# Patient Record
Sex: FEMALE | Race: WHITE | NOT HISPANIC OR LATINO | Employment: OTHER | URBAN - METROPOLITAN AREA
[De-identification: names, ages, dates, MRNs, and addresses within clinical notes are randomized per-mention and may not be internally consistent; named-entity substitution may affect disease eponyms.]

---

## 2021-01-08 ENCOUNTER — OFFICE VISIT CONVERTED (OUTPATIENT)
Dept: ORTHOPEDIC SURGERY | Facility: CLINIC | Age: 46
End: 2021-01-08
Attending: ORTHOPAEDIC SURGERY

## 2021-01-08 ENCOUNTER — CONVERSION ENCOUNTER (OUTPATIENT)
Dept: ORTHOPEDIC SURGERY | Facility: CLINIC | Age: 46
End: 2021-01-08

## 2021-02-22 ENCOUNTER — OFFICE VISIT CONVERTED (OUTPATIENT)
Dept: ORTHOPEDIC SURGERY | Facility: CLINIC | Age: 46
End: 2021-02-22
Attending: ORTHOPAEDIC SURGERY

## 2021-05-10 NOTE — H&P
History and Physical      Patient Name: Brandie Leach   Patient ID: [id number]   Sex: Female   YOB: 1975        Visit Date: January 8, 2021    Provider: Black Mckinney MD   Location: [facility]   Location Address: [address] [place] [address]  [id number]   Location Phone: [phone]          Chief Complaint  · Bilateral Knee Pain      History Of Present Illness  Brandie Leach is a 45 year old female who presents today to [facility].      Patient presents today for an evaluation of bilateral knee pain. Patient states her left knee is worse than right. She states she has had knee problems for over 20 years but has been managing it conservatively. Her pain has been on and off over the 20 years but lately the pain has been increasing and has become more consistent. She has tried physical therapy over the summer of 2020 that gave her some minor relief. She states her left knee pain hurts under her knee cap and her knee swells often. She states her left knee catches a lot as well. Her right knee is similar but isn't as bad and doesn't bother her as much as her left knee. 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       Past Medical History  Arthritis         Past Surgical History  Eye Implant         Allergy List  NO KNOWN DRUG ALLERGIES; NO KNOWN DRUG ALLERGIES       Allergies Reconciled  Family Medical History  Heart Disease         Social History  Alcohol Use (Current some day); lives with spouse; .; Recreational Drug Use (Never); Tobacco (Never); Working         Review of Systems  · Constitutional  o Denies  o : fever, chills, weight loss  · Cardiovascular  o Denies  o : chest pain, shortness of breath  · Gastrointestinal  o Denies  o : liver disease, heartburn, nausea, blood in stools  · Genitourinary  o Denies  o : painful urination, blood in urine  · Integument  o Denies  o : rash, itching  · Neurologic  o Denies  o : headache, weakness, loss of  "consciousness  · Musculoskeletal  o Denies  o : painful, swollen joints  · Psychiatric  o Denies  o : drug/alcohol addiction, anxiety, depression      Vitals  Date Time BP Position Site L\R Cuff Size HR RR TEMP (F) WT  HT  BMI kg/m2 BSA m2 O2 Sat FR L/min FiO2 HC       01/08/2021 03:19 PM      69 - R   160lbs 0oz 6'  1\" 21.11 1.93 100 %            Physical Examination  · Constitutional  o Appearance  o : well developed, well-nourished, no obvious deformities present  · Head and Face  o Head  o :   § Inspection  § : normocephalic  o Face  o :   § Inspection  § : no facial lesions  · Eyes  o Conjunctivae  o : conjunctivae normal  o Sclerae  o : sclerae white  · Ears, Nose, Mouth and Throat  o Ears  o :   § External Ears  § : appearance within normal limits  § Hearing  § : intact  o Nose  o :   § External Nose  § : appearance normal  · Neck  o Inspection/Palpation  o : normal appearance  o Range of Motion  o : full range of motion  · Respiratory  o Respiratory Effort  o : breathing unlabored  o Inspection of Chest  o : normal appearance  o Auscultation of Lungs  o : no audible wheezing or rales  · Cardiovascular  o Heart  o : regular rate  · Gastrointestinal  o Abdominal Examination  o : soft and non-tender  · Skin and Subcutaneous Tissue  o General Inspection  o : intact, no rashes  · Psychiatric  o General  o : Alert and oriented x3  o Judgement and Insight  o : judgment and insight intact  o Mood and Affect  o : mood normal, affect appropriate  · Right Knee  o Inspection  o : Sensation grossly intact. Neurovascular intact. Skin intact. Calf supple, non-tender. Limping gait. No swelling, skin discoloration or atrophy. Full flexion and extension. Mild crepitus. Stable to valgus/varus stress. Good strength in quadriceps, hamstrings, dorsiflexors, and plantar flexors. Mild valgus deformity.   · Left Knee  o Inspection  o : Sensation grossly intact. Neurovascular intact. Skin intact. Calf supple, non-tender. Positive " crepitus. Full flexion and extension. Mild swelling. No skin discoloration or atrophy. Full weight bearing. Limping gait.Stable to valgus/varus stress. Good strength in quadriceps, hamstrings, dorsiflexors, and plantar flexors. Valgus deformity. Palpable bone spurs.   · In Office Procedures  o View  o : LAT/SUNRISE/STANDING   o Site  o : bilateral, knee  o Indication  o : Bilateral knee pain  o Study  o : X-rays ordered, taken in the office, and reviewed today.  o Xray  o : Diffuse cartilage in the left knee. Advanced degenerative changes of patellofemoral. Right knee shows moderate amount of osteoarthritis.   · Imaging  o Imaging  o : 12/4/20 LEFT MRI: 1. 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 Lateral meniscus is also intact. 2. Cruciate and collateral ligaments are intact. 3. Advanced patellofemoral arthrosis, detailed above. 4. Moderate grade chondromalacia along the central and posterior weightbearing medial femoral condyle. Small marginal osteophytes at the medial and lateral joint lines. 5. Small joint effusion with multiple subcentimeter loose bodies along the far posterior nonweightbearing aspect of the lateral femoral condyle. 6. 6.3 cm popliteal cyst.           Assessment  · Primary osteoarthritis of right knee     715.16/M17.11  · Primary osteoarthritis of left knee     715.16/M17.12  · Pain in both knees, unspecified chronicity       Pain in right knee     719.46/M25.561  Pain in left knee     719.46/M25.562      Plan  · Orders  o Knee (Left) [facility] ([id number]) - 719.46/M25.562 - 01/08/2021  o Knee (Right) [facility] ([id number]) - 719.46/M25.561 - 01/08/2021  · Medications  o Medications have been Reconciled  o Transition of Care or Provider Policy  · Instructions  o Dr. Mckinney saw and examined the patient and agrees with plan.   o X-rays reviewed by Dr. Mckinney.  o Reviewed the patient's Past Medical, Social, and Family history as  well as the ROS at today's visit, no changes.  o Call or return if worsening symptoms.  o Follow Up PRN.  o This note was transcribed by Bobbi Mullen. tobin  o Discussed diagnosis and treatment options with the patient. Discussed operative vs non-operative measures. Patient doesn't wish to proceed with surgical intervention at this time. Patient will consider an injection if her knee pains worsen. She will continue her at home conservative treatments.  · Referrals  o ID: [id number] Date: 01/05/2021 Type: Inbound  Specialty: Orthopedic Surgery            Electronically Signed by: Bobbi Mullen-, Other -Author on January 11, 2021 08:36:52 AM  Electronically Co-signed by: Black Mckinney MD -Reviewer on January 11, 2021 10:35:50 PM

## 2021-05-14 VITALS — HEART RATE: 69 BPM | OXYGEN SATURATION: 100 % | HEIGHT: 72 IN | WEIGHT: 160 LBS | BODY MASS INDEX: 21.67 KG/M2

## 2021-05-14 VITALS — WEIGHT: 158 LBS | OXYGEN SATURATION: 98 % | HEART RATE: 82 BPM | HEIGHT: 72 IN | BODY MASS INDEX: 21.4 KG/M2

## 2021-05-14 NOTE — PROGRESS NOTES
Progress Note      Patient Name: Brandie Leach   Patient ID: [id number]   Sex: Female   YOB: 1975        Visit Date: February 22, 2021    Provider: Black Mckinney MD   Location: [facility]   Location Address: [address] [place] [address]  [id number]   Location Phone: [phone]          Chief Complaint  · Left knee pain       History Of Present Illness  Brandie Leach is a 46 year old female who presents today to [facility].      Patient presents today with a follow-up of left knee pain. She states she has had knee problems for over 20 years but has been managing it conservatively. Her pain has been on and off over the 20 years but lately the pain has been increasing and has become more consistent. She has tried physical therapy over the summer of 2020 that gave her some minor relief. She states her left knee pain hurts under her knee cap and her knee swells often. She states her left knee catches a lot as well. 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 Patient presents today to receive a left knee injection.       Past Medical History  Arthritis         Past Surgical History  Eye Implant         Allergy List  NO KNOWN DRUG ALLERGIES; NO KNOWN DRUG ALLERGIES       Allergies Reconciled  Family Medical History  Heart Disease         Social History  Alcohol Use (Current some day); lives with spouse; .; Recreational Drug Use (Never); Tobacco (Never); Working         Review of Systems  · Constitutional  o Denies  o : fever, chills, weight loss  · Cardiovascular  o Denies  o : chest pain, shortness of breath  · Gastrointestinal  o Denies  o : liver disease, heartburn, nausea, blood in stools  · Genitourinary  o Denies  o : painful urination, blood in urine  · Integument  o Denies  o : rash, itching  · Neurologic  o Denies  o : headache, weakness, loss of consciousness  · Musculoskeletal  o Denies  o : painful, swollen  "joints  · Psychiatric  o Denies  o : drug/alcohol addiction, anxiety, depression      Vitals  Date Time BP Position Site L\R Cuff Size HR RR TEMP (F) WT  HT  BMI kg/m2 BSA m2 O2 Sat FR L/min FiO2 HC       02/22/2021 10:32 AM      82 - R   157lbs 16oz 6'  1\" 20.85 1.92 98 %            Physical Examination  · Constitutional  o Appearance  o : well developed, well-nourished, no obvious deformities present  · Head and Face  o Head  o :   § Inspection  § : normocephalic  o Face  o :   § Inspection  § : no facial lesions  · Eyes  o Conjunctivae  o : conjunctivae normal  o Sclerae  o : sclerae white  · Ears, Nose, Mouth and Throat  o Ears  o :   § External Ears  § : appearance within normal limits  § Hearing  § : intact  o Nose  o :   § External Nose  § : appearance normal  · Neck  o Inspection/Palpation  o : normal appearance  o Range of Motion  o : full range of motion  · Respiratory  o Respiratory Effort  o : breathing unlabored  o Inspection of Chest  o : normal appearance  o Auscultation of Lungs  o : no audible wheezing or rales  · Cardiovascular  o Heart  o : regular rate  · Gastrointestinal  o Abdominal Examination  o : soft and non-tender  · Skin and Subcutaneous Tissue  o General Inspection  o : intact, no rashes  · Psychiatric  o General  o : Alert and oriented x3  o Judgement and Insight  o : judgment and insight intact  o Mood and Affect  o : mood normal, affect appropriate  · Left Knee  o Inspection  o : Sensation grossly intact. Neurovascular intact. Skin intact. Calf supple, non-tender. Positive crepitus. Full flexion and extension. Mild swelling. No skin discoloration or atrophy. Full weight bearing. Limping gait. Stable to valgus/varus stress. Good strength in quadriceps, hamstrings, dorsiflexors, and plantar flexors. Valgus deformity. Palpable bone spurs. Negative Lachman. Negative Apley's.   · Injection Note/Aspiration Note  o Site  o : left knee  o Procedure  o : Procedure: After educating the patient, " patient gave consent for procedure. After using Chloraprep, the joint space was injected. The patient tolerated the procedure well.  o Medication  o : 80 mg of DepoMedrol with 9cc of 1% Lidocaine          Assessment  · Primary osteoarthritis of left knee     715.16/M17.12  · Left knee pain, unspecified chronicity     719.46/M25.562      Plan  · Orders  o Depo-Medrol injection 80mg () - - 02/22/2021   Lot [id number] Exp 01 2022 [employer] Administered by MARYAM COLEY MD   o Knee Intra-articular Injection without US Guidance [facility] ([id number]) - - 02/22/2021   Lot [id number] Exp 03 01 2022 [employer] Administered by MARYAM COLEY MD   · Medications  o Medications have been Reconciled  o Transition of Care or Provider Policy  · Instructions  o Dr. Coley saw and examined the patient and agrees with plan.   o Reviewed the patient's Past Medical, Social, and Family history as well as the ROS at today's visit, no changes.  o Call or return if worsening symptoms.  o Follow Up PRN.  o The above service was scribed by Bbobi Mullen on my behalf and I attest to the accuracy of the note. tobin  o Discussed diagnosis and treatment options with the patient. Patient received a left knee injection and tolerated it well.  · Referrals  o ID: [id number] Date: 01/05/2021 Type: Inbound  Specialty: Orthopedic Surgery            Electronically Signed by: Bobbi Mullen-, Other -Author on February 25, 2021 08:40:30 AM  Electronically Co-signed by: Black Coley MD -Reviewer on February 25, 2021 09:07:35 PM

## 2021-09-03 ENCOUNTER — OFFICE VISIT (OUTPATIENT)
Dept: ORTHOPEDIC SURGERY | Facility: CLINIC | Age: 46
End: 2021-09-03

## 2021-09-03 VITALS — HEIGHT: 72 IN | WEIGHT: 150 LBS | OXYGEN SATURATION: 100 % | HEART RATE: 58 BPM | BODY MASS INDEX: 20.32 KG/M2

## 2021-09-03 DIAGNOSIS — M17.0 BILATERAL PRIMARY OSTEOARTHRITIS OF KNEE: Primary | ICD-10-CM

## 2021-09-03 PROCEDURE — 20610 DRAIN/INJ JOINT/BURSA W/O US: CPT | Performed by: ORTHOPAEDIC SURGERY

## 2021-09-03 RX ADMIN — METHYLPREDNISOLONE ACETATE 80 MG: 80 INJECTION, SUSPENSION INTRA-ARTICULAR; INTRALESIONAL; INTRAMUSCULAR; SOFT TISSUE at 13:53

## 2021-09-03 RX ADMIN — LIDOCAINE HYDROCHLORIDE 9 ML: 10 INJECTION, SOLUTION INFILTRATION; PERINEURAL at 13:53

## 2021-09-03 NOTE — PROGRESS NOTES
"Chief Complaint  Follow-up of the Left Knee and Follow-up of the Right Knee     Subjective      Brandie Leach presents to [facility] for a follow-up of bilateral knees. Patient has bilateral knee moderate osteoarthritis that she has been treating conservatively. Injections received 6 months ago provided her with significant relief. She states that the injection has begun wearing off a few weeks ago and she has been having some increasing pain in bilateral knees.     No Known Allergies     Social History     Socioeconomic History   • Marital status:      Spouse name: Not on file   • Number of children: Not on file   • Years of education: Not on file   • Highest education level: Not on file   Tobacco Use   • Smoking status: Never Smoker   Substance and Sexual Activity   • Alcohol use: Yes     Comment: occasionally drinks less than 1 drink per day, has been drinking for 21-30 years    • Drug use: Never        Review of Systems     Objective   Vital Signs:   Pulse 58   Ht 185.4 cm (73\")   Wt 68 kg (150 lb)   SpO2 100%   BMI 19.79 kg/m²       Physical Exam  Constitutional:       Appearance: Normal appearance. Patient is well-developed and normal weight.   HENT:      Head: Normocephalic.      Right Ear: Hearing and external ear normal.      Left Ear: Hearing and external ear normal.      Nose: Nose normal.   Eyes:      Conjunctiva/sclera: Conjunctivae normal.   Cardiovascular:      Rate and Rhythm: Normal rate.   Pulmonary:      Effort: Pulmonary effort is normal.      Breath sounds: No wheezing or rales.   Abdominal:      Palpations: Abdomen is soft.      Tenderness: There is no abdominal tenderness.   Musculoskeletal:      Cervical back: Normal range of motion.   Skin:     Findings: No rash.   Neurological:      Mental Status: Patient  is alert and oriented to person, place, and time.   Psychiatric:         Mood and Affect: Mood and affect normal.         Judgment: Judgment " normal.       Ortho Exam      BILATERAL KNEES: Tenderness along the medial joint line. Tenderness about the lateral joint line. Dorsal Pedal Pulse 2+, posterior tibialis pulse 2+. Calf supple, non-tender. Good strength to hamstrings, quadriceps, dorsiflexors and plantar flexors. Sensation grossly intact. Neurovascular intact. Skin intact. Full flexion and extension. Non-antalgic gait. No swelling, skin discoloration or atrophy. Stable to varus/valgus stress. Negative lachman.       Large Joint Arthrocentesis: L knee  Date/Time: 9/3/2021 1:53 PM  Consent given by: patient  Site marked: site marked  Timeout: Immediately prior to procedure a time out was called to verify the correct patient, procedure, equipment, support staff and site/side marked as required   Supporting Documentation  Indications: pain   Procedure Details  Location: knee - L knee  Needle size: 22 G  Medications administered: 9 mL lidocaine 1 %; 80 mg methylPREDNISolone acetate 80 MG/ML  Patient tolerance: patient tolerated the procedure well with no immediate complications              Imaging Results (Most Recent)     None           Result Review :       No results found.           Assessment and Plan     DX: Bilateral knee osteoarthritis     Previous injections have given patient relief from pain. She was given bilateral knee injections and tolerated this well.     Call or return if worsening symptoms.    Follow Up     PRN.       Patient was given instructions and counseling regarding her condition or for health maintenance advice. Please see specific information pulled into the AVS if appropriate.     Scribed for Black Mckinney MD by Bobbi Mullen.  09/03/21   13:38 EDT      I have personally performed the services described in this document as scribed by the above individual and it is both accurate and complete. Black Mckinney MD 09/08/21

## 2021-09-08 RX ORDER — METHYLPREDNISOLONE ACETATE 80 MG/ML
80 INJECTION, SUSPENSION INTRA-ARTICULAR; INTRALESIONAL; INTRAMUSCULAR; SOFT TISSUE
Status: COMPLETED | OUTPATIENT
Start: 2021-09-03 | End: 2021-09-03

## 2021-09-08 RX ORDER — LIDOCAINE HYDROCHLORIDE 10 MG/ML
9 INJECTION, SOLUTION INFILTRATION; PERINEURAL
Status: COMPLETED | OUTPATIENT
Start: 2021-09-03 | End: 2021-09-03

## 2023-02-01 ENCOUNTER — PREP FOR SURGERY (OUTPATIENT)
Dept: OTHER | Facility: HOSPITAL | Age: 48
End: 2023-02-01
Payer: OTHER GOVERNMENT

## 2023-02-01 ENCOUNTER — CLINICAL SUPPORT (OUTPATIENT)
Dept: GASTROENTEROLOGY | Facility: CLINIC | Age: 48
End: 2023-02-01
Payer: OTHER GOVERNMENT

## 2023-02-01 DIAGNOSIS — Z12.11 COLON CANCER SCREENING: Primary | ICD-10-CM

## 2023-02-01 RX ORDER — PEG-3350, SODIUM SULFATE, SODIUM CHLORIDE, POTASSIUM CHLORIDE, SODIUM ASCORBATE AND ASCORBIC ACID 7.5-2.691G
1000 KIT ORAL EVERY 12 HOURS
Qty: 1000 ML | Refills: 0 | Status: SHIPPED | OUTPATIENT
Start: 2023-02-01

## 2023-02-01 NOTE — PROGRESS NOTES
Brandie Leach  REASON FOR CALL Screening for colonoscopy   SENT IN PREP moviprep  Patient declines being treated by a cardiologist at this time  Patient declines taking a blood thinner at this time   Patient declines being treated by a pulmonologist at this time   Past Medical History:   Diagnosis Date   • Arthritis      No Known Allergies  Past Surgical History:   Procedure Laterality Date   • INTRAOCULAR LENS INSERTION      eye implant, yes      Social History     Socioeconomic History   • Marital status:    Tobacco Use   • Smoking status: Never   • Smokeless tobacco: Never   Vaping Use   • Vaping Use: Never used   Substance and Sexual Activity   • Alcohol use: Yes     Comment: occasionally drinks less than 1 drink per day, has been drinking for 21-30 years    • Drug use: Never   • Sexual activity: Defer     Family History   Problem Relation Age of Onset   • Heart disease Mother      No current outpatient medications on file.

## 2023-03-03 ENCOUNTER — TELEPHONE (OUTPATIENT)
Dept: GASTROENTEROLOGY | Facility: CLINIC | Age: 48
End: 2023-03-03
Payer: OTHER GOVERNMENT

## 2023-03-03 NOTE — TELEPHONE ENCOUNTER
Patient left a voice message at [phone] needing to reschedule her scope on 5/15/23. She said she has appointments conflicts.

## 2023-04-14 ENCOUNTER — PREP FOR SURGERY (OUTPATIENT)
Dept: OTHER | Facility: HOSPITAL | Age: 48
End: 2023-04-14
Payer: OTHER GOVERNMENT

## 2023-06-12 RX ORDER — PEG-3350, SODIUM SULFATE, SODIUM CHLORIDE, POTASSIUM CHLORIDE, SODIUM ASCORBATE AND ASCORBIC ACID 7.5-2.691G
KIT ORAL
Qty: 1 EACH | Refills: 0 | Status: ON HOLD | OUTPATIENT
Start: 2023-06-12 | End: 2023-06-16

## 2023-06-12 NOTE — PRE-PROCEDURE INSTRUCTIONS
"Instructed on date and time of arrival.  Come to entrance \"C\".  Will need  over the age of 18 to drive home.  May have two visitors; however, no children under the age of 12.  Twelve and older must be accompanied by an adult.  Discussed clear liquid diet (no red or purple) and bowel prep.  May take medications as usual except blood thinners.  Bring meds or a list to the hospital.  Verbalized understanding of instructions.  Phone number given for questions or concern.   "

## 2023-06-16 ENCOUNTER — HOSPITAL ENCOUNTER (OUTPATIENT)
Facility: HOSPITAL | Age: 48
Setting detail: HOSPITAL OUTPATIENT SURGERY
Discharge: HOME | End: 2023-06-16
Attending: INTERNAL MEDICINE | Admitting: INTERNAL MEDICINE
Payer: OTHER GOVERNMENT

## 2023-06-16 ENCOUNTER — ANESTHESIA (OUTPATIENT)
Dept: GASTROENTEROLOGY | Facility: HOSPITAL | Age: 48
End: 2023-06-16
Payer: OTHER GOVERNMENT

## 2023-06-16 ENCOUNTER — ANESTHESIA EVENT (OUTPATIENT)
Dept: GASTROENTEROLOGY | Facility: HOSPITAL | Age: 48
End: 2023-06-16
Payer: OTHER GOVERNMENT

## 2023-06-16 ENCOUNTER — TELEPHONE (OUTPATIENT)
Dept: GASTROENTEROLOGY | Facility: CLINIC | Age: 48
End: 2023-06-16
Payer: OTHER GOVERNMENT

## 2023-06-16 VITALS
SYSTOLIC BLOOD PRESSURE: 111 MMHG | HEIGHT: 72 IN | OXYGEN SATURATION: 100 % | HEART RATE: 59 BPM | RESPIRATION RATE: 13 BRPM | BODY MASS INDEX: 19.86 KG/M2 | WEIGHT: 146.61 LBS | TEMPERATURE: 97.6 F | DIASTOLIC BLOOD PRESSURE: 74 MMHG

## 2023-06-16 LAB — B-HCG UR QL: NEGATIVE

## 2023-06-16 PROCEDURE — 25010000002 PROPOFOL 10 MG/ML EMULSION: Performed by: NURSE ANESTHETIST, CERTIFIED REGISTERED

## 2023-06-16 PROCEDURE — 81025 URINE PREGNANCY TEST: CPT | Performed by: INTERNAL MEDICINE

## 2023-06-16 RX ORDER — ERGOCALCIFEROL (VITAMIN D2) 10 MCG
400 TABLET ORAL DAILY
COMMUNITY

## 2023-06-16 RX ORDER — SODIUM CHLORIDE, SODIUM LACTATE, POTASSIUM CHLORIDE, CALCIUM CHLORIDE 600; 310; 30; 20 MG/100ML; MG/100ML; MG/100ML; MG/100ML
30 INJECTION, SOLUTION INTRAVENOUS CONTINUOUS
Status: DISCONTINUED | OUTPATIENT
Start: 2023-06-16 | End: 2023-06-16 | Stop reason: HOSPADM

## 2023-06-16 RX ORDER — LIDOCAINE HYDROCHLORIDE 20 MG/ML
INJECTION, SOLUTION EPIDURAL; INFILTRATION; INTRACAUDAL; PERINEURAL AS NEEDED
Status: DISCONTINUED | OUTPATIENT
Start: 2023-06-16 | End: 2023-06-16 | Stop reason: SURG

## 2023-06-16 RX ORDER — SODIUM CHLORIDE, SODIUM LACTATE, POTASSIUM CHLORIDE, CALCIUM CHLORIDE 600; 310; 30; 20 MG/100ML; MG/100ML; MG/100ML; MG/100ML
INJECTION, SOLUTION INTRAVENOUS CONTINUOUS PRN
Status: DISCONTINUED | OUTPATIENT
Start: 2023-06-16 | End: 2023-06-16 | Stop reason: SURG

## 2023-06-16 RX ADMIN — SODIUM CHLORIDE, POTASSIUM CHLORIDE, SODIUM LACTATE AND CALCIUM CHLORIDE: 600; 310; 30; 20 INJECTION, SOLUTION INTRAVENOUS at 10:04

## 2023-06-16 RX ADMIN — PROPOFOL 200 MCG/KG/MIN: 10 INJECTION, EMULSION INTRAVENOUS at 10:04

## 2023-06-16 RX ADMIN — LIDOCAINE HYDROCHLORIDE 80 MG: 20 INJECTION, SOLUTION EPIDURAL; INFILTRATION; INTRACAUDAL; PERINEURAL at 10:04

## 2023-06-16 RX ADMIN — SODIUM CHLORIDE, POTASSIUM CHLORIDE, SODIUM LACTATE AND CALCIUM CHLORIDE 30 ML/HR: 600; 310; 30; 20 INJECTION, SOLUTION INTRAVENOUS at 09:12

## 2023-06-16 NOTE — ANESTHESIA PREPROCEDURE EVALUATION
Anesthesia Evaluation     Patient summary reviewed and Nursing notes reviewed   no history of anesthetic complications:   NPO Solid Status: > 8 hours  NPO Liquid Status: > 2 hours           Airway   Mallampati: I  TM distance: >3 FB  Neck ROM: full  No difficulty expected  Dental      Pulmonary - negative pulmonary ROS and normal exam    breath sounds clear to auscultation  Cardiovascular - negative cardio ROS and normal exam  Exercise tolerance: good (4-7 METS)    Rhythm: regular  Rate: normal        Neuro/Psych- negative ROS  GI/Hepatic/Renal/Endo - negative ROS     Musculoskeletal (-) negative ROS    Abdominal    Substance History - negative use     OB/GYN negative ob/gyn ROS         Other - negative ROS  arthritis,     ROS/Med Hx Other: PAT Nursing Notes unavailable.                   Anesthesia Plan    ASA 1     general     (Total IV Anesthesia    Patient understands anesthesia not responsible for dental damage.  )  intravenous induction     Anesthetic plan, risks, benefits, and alternatives have been provided, discussed and informed consent has been obtained with: patient.    Plan discussed with CRNA.      CODE STATUS:

## 2023-06-16 NOTE — ANESTHESIA POSTPROCEDURE EVALUATION
Patient: Brandie Leach    Procedure Summary       Date: 06/16/23 Room / Location: [facility] ENDOSCOPY 4 / [facility] ENDOSCOPY    Anesthesia Start: 1004 Anesthesia Stop: 1032    Procedure: COLONOSCOPY Diagnosis:       Colon cancer screening      (Colon cancer screening [Z12.11])    Surgeons: Kaylee Larsen MD Provider: Uriel Greer MD    Anesthesia Type: general ASA Status: 1            Anesthesia Type: general    Vitals  Vitals Value Taken Time   /62 06/16/23 1036   Temp 36.4 °C (97.6 °F) 06/16/23 1031   Pulse 63 06/16/23 1036   Resp 14 06/16/23 1036   SpO2 97 % 06/16/23 1036           Post Anesthesia Care and Evaluation    Patient location during evaluation: bedside  Patient participation: complete - patient participated  Level of consciousness: awake  Pain management: adequate    Airway patency: patent  PONV Status: none  Cardiovascular status: acceptable and stable  Respiratory status: acceptable  Hydration status: acceptable    Comments: An Anesthesiologist personally participated in the most demanding procedures (including induction and emergence if applicable) in the anesthesia plan, monitored the course of anesthesia administration at frequent intervals and remained physically present and available for immediate diagnosis and treatment of emergencies.

## 2023-06-16 NOTE — H&P
"Pre Procedure History & Physical    Chief Complaint:   Screening colonoscopy    Subjective     HPI:   47 yo F here for screening colonoscopy.    Past Medical History:   Past Medical History:   Diagnosis Date    Arthritis        Past Surgical History:  Past Surgical History:   Procedure Laterality Date    CYST REMOVAL      benign cyst removed from abdomen    INTRAOCULAR LENS INSERTION      eye implant, yes        Family History:  Family History   Problem Relation Age of Onset    Heart disease Mother        Social History:   reports that she has never smoked. She has never used smokeless tobacco. She reports current alcohol use. She reports that she does not use drugs.    Medications:   Medications Prior to Admission   Medication Sig Dispense Refill Last Dose    Vitamin D, Cholecalciferol, (CHOLECALCIFEROL) 10 MCG (400 UNIT) tablet Take 1 tablet by mouth Daily.          Allergies:  Patient has no known allergies.    ROS:    Pertinent items are noted in HPI, all other systems reviewed and negative     Objective     Blood pressure 127/79, pulse (!) 49, temperature 98.2 °F (36.8 °C), temperature source Temporal, resp. rate 20, height 188 cm (74\"), weight 66.5 kg (146 lb 9.7 oz), SpO2 100 %.    Physical Exam   Constitutional: Pt is oriented to person, place, and time and well-developed, well-nourished, and in no distress.   Mouth/Throat: Oropharynx is clear and moist.   Neck: Normal range of motion.   Cardiovascular: Normal rate, regular rhythm and normal heart sounds.    Pulmonary/Chest: Effort normal and breath sounds normal.   Abdominal: Soft. Nontender  Skin: Skin is warm and dry.   Psychiatric: Mood, memory, affect and judgment normal.     Assessment & Plan     Diagnosis:  Screening colonoscopy    Anticipated Surgical Procedure:  Colonoscopy    The risks, benefits, and alternatives of this procedure have been discussed with the patient or the responsible party- the patient understands and agrees to proceed.         "

## (undated) DEVICE — Device

## (undated) DEVICE — SOL IRRG H2O PL/BG 1000ML STRL

## (undated) DEVICE — LINER SURG CANSTR SXN S/RIGD 1500CC

## (undated) DEVICE — SOLIDIFIER LIQLOC PLS 1500CC BT

## (undated) DEVICE — Device: Brand: DEFENDO AIR/WATER/SUCTION AND BIOPSY VALVE

## (undated) DEVICE — CONN JET HYDRA H20 AUXILIARY DISP